# Patient Record
Sex: MALE | Race: OTHER | HISPANIC OR LATINO | URBAN - METROPOLITAN AREA
[De-identification: names, ages, dates, MRNs, and addresses within clinical notes are randomized per-mention and may not be internally consistent; named-entity substitution may affect disease eponyms.]

---

## 2017-04-03 ENCOUNTER — EMERGENCY (EMERGENCY)
Facility: HOSPITAL | Age: 22
LOS: 1 days | Discharge: HOME | End: 2017-04-03
Attending: EMERGENCY MEDICINE | Admitting: EMERGENCY MEDICINE
Payer: SELF-PAY

## 2017-04-03 VITALS
WEIGHT: 175.05 LBS | DIASTOLIC BLOOD PRESSURE: 80 MMHG | SYSTOLIC BLOOD PRESSURE: 134 MMHG | TEMPERATURE: 99 F | OXYGEN SATURATION: 98 % | HEART RATE: 74 BPM | RESPIRATION RATE: 17 BRPM
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
## 2017-04-03 DIAGNOSIS — S61.012A LACERATION WITHOUT FOREIGN BODY OF LEFT THUMB WITHOUT DAMAGE TO NAIL, INITIAL ENCOUNTER: ICD-10-CM

## 2017-04-03 DIAGNOSIS — Z23 ENCOUNTER FOR IMMUNIZATION: ICD-10-CM
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
## 2017-04-03 PROCEDURE — 99283 EMERGENCY DEPT VISIT LOW MDM: CPT | Mod: 25

## 2017-04-03 PROCEDURE — 90715 TDAP VACCINE 7 YRS/> IM: CPT

## 2017-04-03 PROCEDURE — 73140 X-RAY EXAM OF FINGER(S): CPT

## 2017-04-03 PROCEDURE — 99283 EMERGENCY DEPT VISIT LOW MDM: CPT

## 2017-04-03 PROCEDURE — 90471 IMMUNIZATION ADMIN: CPT

## 2017-04-03 PROCEDURE — 73140 X-RAY EXAM OF FINGER(S): CPT | Mod: 26,LT

## 2017-04-03 RX ORDER — ACETAMINOPHEN 500 MG
650 TABLET ORAL ONCE
Qty: 0 | Refills: 0 | Status: COMPLETED | OUTPATIENT
Start: 2017-04-03 | End: 2017-04-03

## 2017-04-03 RX ORDER — TETANUS TOXOID, REDUCED DIPHTHERIA TOXOID AND ACELLULAR PERTUSSIS VACCINE, ADSORBED 5; 2.5; 8; 8; 2.5 [IU]/.5ML; [IU]/.5ML; UG/.5ML; UG/.5ML; UG/.5ML
0.5 SUSPENSION INTRAMUSCULAR ONCE
Qty: 0 | Refills: 0 | Status: COMPLETED | OUTPATIENT
Start: 2017-04-03 | End: 2017-04-03

## 2017-04-03 RX ORDER — CEPHALEXIN 500 MG
1 CAPSULE ORAL
Qty: 20 | Refills: 0 | OUTPATIENT
Start: 2017-04-03 | End: 2017-04-08

## 2017-04-03 RX ADMIN — TETANUS TOXOID, REDUCED DIPHTHERIA TOXOID AND ACELLULAR PERTUSSIS VACCINE, ADSORBED 0.5 MILLILITER(S): 5; 2.5; 8; 8; 2.5 SUSPENSION INTRAMUSCULAR at 18:48

## 2017-04-03 RX ADMIN — Medication 650 MILLIGRAM(S): at 20:47

## 2017-04-03 NOTE — ED PROVIDER NOTE - PHYSICAL EXAMINATION
apx 2 x 1 cm v-shaped avulsion flap (partial skin thickness, no underlying injury) to pad of left thumb.

## 2017-04-03 NOTE — ED PROVIDER NOTE - OBJECTIVE STATEMENT
RHD male  was at work today, when a metal wall stud fell from overhead; he raised his left hand to protect his head, and the edge of the stud cut the pad of his left thumb.  No hand or wrist pain.  No head injury or neck/back pain.  Denies other injuries.  Unk last tetanus

## 2017-04-03 NOTE — ED PROVIDER NOTE - MEDICAL DECISION MAKING DETAILS
RHD male  with left thumb pad avulsion flap.  Clean with no involvement of deep structures.  XR neg for Fx.  Thoroughly cleaned and debrided, tacked down the flap with sutures in attempt to save sensation to pad of thumb.  d/w Dr Deo (hand surgery) and he will see patient this Friday.  Tetanus booster given and abx Rx written.

## 2017-04-03 NOTE — ED ADULT TRIAGE NOTE - CHIEF COMPLAINT QUOTE
Pt has laceration to the L 1 digit. "The stud is about to fall on my head so I block it with my hand." Bleeding controlled.

## 2019-06-22 NOTE — ED PROVIDER NOTE - CONDUCTED A DETAILED DISCUSSION WITH PATIENT AND/OR GUARDIAN REGARDING, MDM
No significant past surgical history return to ED if symptoms worsen, persist or questions arise/need for outpatient follow-up/radiology results